# Patient Record
Sex: MALE | Race: WHITE | NOT HISPANIC OR LATINO | Employment: FULL TIME | URBAN - METROPOLITAN AREA
[De-identification: names, ages, dates, MRNs, and addresses within clinical notes are randomized per-mention and may not be internally consistent; named-entity substitution may affect disease eponyms.]

---

## 2019-02-13 ENCOUNTER — APPOINTMENT (OUTPATIENT)
Dept: RADIOLOGY | Facility: MEDICAL CENTER | Age: 34
End: 2019-02-13
Attending: EMERGENCY MEDICINE
Payer: COMMERCIAL

## 2019-02-13 ENCOUNTER — HOSPITAL ENCOUNTER (EMERGENCY)
Facility: MEDICAL CENTER | Age: 34
End: 2019-02-13
Attending: EMERGENCY MEDICINE
Payer: COMMERCIAL

## 2019-02-13 VITALS
WEIGHT: 186.51 LBS | TEMPERATURE: 97.2 F | OXYGEN SATURATION: 98 % | RESPIRATION RATE: 18 BRPM | DIASTOLIC BLOOD PRESSURE: 72 MMHG | HEART RATE: 61 BPM | BODY MASS INDEX: 25.3 KG/M2 | SYSTOLIC BLOOD PRESSURE: 152 MMHG

## 2019-02-13 DIAGNOSIS — R51.9 ACUTE NONINTRACTABLE HEADACHE, UNSPECIFIED HEADACHE TYPE: ICD-10-CM

## 2019-02-13 LAB
ALBUMIN SERPL BCP-MCNC: 5 G/DL (ref 3.2–4.9)
ALBUMIN/GLOB SERPL: 1.7 G/DL
ALP SERPL-CCNC: 73 U/L (ref 30–99)
ALT SERPL-CCNC: 33 U/L (ref 2–50)
ANION GAP SERPL CALC-SCNC: 10 MMOL/L (ref 0–11.9)
APTT PPP: 35.7 SEC (ref 24.7–36)
AST SERPL-CCNC: 33 U/L (ref 12–45)
BASOPHILS # BLD AUTO: 0.5 % (ref 0–1.8)
BASOPHILS # BLD: 0.05 K/UL (ref 0–0.12)
BILIRUB SERPL-MCNC: 0.8 MG/DL (ref 0.1–1.5)
BUN SERPL-MCNC: 7 MG/DL (ref 8–22)
CALCIUM SERPL-MCNC: 10.5 MG/DL (ref 8.5–10.5)
CHLORIDE SERPL-SCNC: 107 MMOL/L (ref 96–112)
CO2 SERPL-SCNC: 23 MMOL/L (ref 20–33)
CREAT SERPL-MCNC: 0.92 MG/DL (ref 0.5–1.4)
EOSINOPHIL # BLD AUTO: 0.13 K/UL (ref 0–0.51)
EOSINOPHIL NFR BLD: 1.2 % (ref 0–6.9)
ERYTHROCYTE [DISTWIDTH] IN BLOOD BY AUTOMATED COUNT: 40.8 FL (ref 35.9–50)
GLOBULIN SER CALC-MCNC: 2.9 G/DL (ref 1.9–3.5)
GLUCOSE SERPL-MCNC: 90 MG/DL (ref 65–99)
HCT VFR BLD AUTO: 52.7 % (ref 42–52)
HGB BLD-MCNC: 17.7 G/DL (ref 14–18)
IMM GRANULOCYTES # BLD AUTO: 0.04 K/UL (ref 0–0.11)
IMM GRANULOCYTES NFR BLD AUTO: 0.4 % (ref 0–0.9)
INR PPP: 0.96 (ref 0.87–1.13)
LYMPHOCYTES # BLD AUTO: 2.15 K/UL (ref 1–4.8)
LYMPHOCYTES NFR BLD: 19.8 % (ref 22–41)
MCH RBC QN AUTO: 30.8 PG (ref 27–33)
MCHC RBC AUTO-ENTMCNC: 33.6 G/DL (ref 33.7–35.3)
MCV RBC AUTO: 91.7 FL (ref 81.4–97.8)
MONOCYTES # BLD AUTO: 0.46 K/UL (ref 0–0.85)
MONOCYTES NFR BLD AUTO: 4.2 % (ref 0–13.4)
NEUTROPHILS # BLD AUTO: 8.04 K/UL (ref 1.82–7.42)
NEUTROPHILS NFR BLD: 73.9 % (ref 44–72)
NRBC # BLD AUTO: 0 K/UL
NRBC BLD-RTO: 0 /100 WBC
PLATELET # BLD AUTO: 207 K/UL (ref 164–446)
PMV BLD AUTO: 10.7 FL (ref 9–12.9)
POTASSIUM SERPL-SCNC: 4.1 MMOL/L (ref 3.6–5.5)
PROT SERPL-MCNC: 7.9 G/DL (ref 6–8.2)
PROTHROMBIN TIME: 12.9 SEC (ref 12–14.6)
RBC # BLD AUTO: 5.75 M/UL (ref 4.7–6.1)
SODIUM SERPL-SCNC: 140 MMOL/L (ref 135–145)
WBC # BLD AUTO: 10.9 K/UL (ref 4.8–10.8)

## 2019-02-13 PROCEDURE — 96376 TX/PRO/DX INJ SAME DRUG ADON: CPT

## 2019-02-13 PROCEDURE — 700111 HCHG RX REV CODE 636 W/ 250 OVERRIDE (IP): Performed by: EMERGENCY MEDICINE

## 2019-02-13 PROCEDURE — 700101 HCHG RX REV CODE 250: Performed by: EMERGENCY MEDICINE

## 2019-02-13 PROCEDURE — 70496 CT ANGIOGRAPHY HEAD: CPT

## 2019-02-13 PROCEDURE — 96375 TX/PRO/DX INJ NEW DRUG ADDON: CPT

## 2019-02-13 PROCEDURE — 700117 HCHG RX CONTRAST REV CODE 255: Performed by: EMERGENCY MEDICINE

## 2019-02-13 PROCEDURE — 85025 COMPLETE CBC W/AUTO DIFF WBC: CPT

## 2019-02-13 PROCEDURE — 96372 THER/PROPH/DIAG INJ SC/IM: CPT

## 2019-02-13 PROCEDURE — 99284 EMERGENCY DEPT VISIT MOD MDM: CPT

## 2019-02-13 PROCEDURE — 20552 NJX 1/MLT TRIGGER POINT 1/2: CPT

## 2019-02-13 PROCEDURE — 85730 THROMBOPLASTIN TIME PARTIAL: CPT

## 2019-02-13 PROCEDURE — 80053 COMPREHEN METABOLIC PANEL: CPT

## 2019-02-13 PROCEDURE — 96374 THER/PROPH/DIAG INJ IV PUSH: CPT

## 2019-02-13 PROCEDURE — 700105 HCHG RX REV CODE 258: Performed by: EMERGENCY MEDICINE

## 2019-02-13 PROCEDURE — 85610 PROTHROMBIN TIME: CPT

## 2019-02-13 RX ORDER — KETOROLAC TROMETHAMINE 30 MG/ML
30 INJECTION, SOLUTION INTRAMUSCULAR; INTRAVENOUS ONCE
Status: COMPLETED | OUTPATIENT
Start: 2019-02-13 | End: 2019-02-13

## 2019-02-13 RX ORDER — ONDANSETRON 2 MG/ML
4 INJECTION INTRAMUSCULAR; INTRAVENOUS ONCE
Status: COMPLETED | OUTPATIENT
Start: 2019-02-13 | End: 2019-02-13

## 2019-02-13 RX ORDER — SODIUM CHLORIDE 9 MG/ML
1000 INJECTION, SOLUTION INTRAVENOUS ONCE
Status: COMPLETED | OUTPATIENT
Start: 2019-02-13 | End: 2019-02-13

## 2019-02-13 RX ORDER — LIDOCAINE HYDROCHLORIDE 20 MG/ML
20 INJECTION, SOLUTION INFILTRATION; PERINEURAL ONCE
Status: COMPLETED | OUTPATIENT
Start: 2019-02-13 | End: 2019-02-13

## 2019-02-13 RX ORDER — MORPHINE SULFATE 4 MG/ML
4 INJECTION, SOLUTION INTRAMUSCULAR; INTRAVENOUS
Status: DISCONTINUED | OUTPATIENT
Start: 2019-02-13 | End: 2019-02-13 | Stop reason: HOSPADM

## 2019-02-13 RX ORDER — BUPIVACAINE HYDROCHLORIDE 5 MG/ML
10 INJECTION, SOLUTION EPIDURAL; INTRACAUDAL ONCE
Status: COMPLETED | OUTPATIENT
Start: 2019-02-13 | End: 2019-02-13

## 2019-02-13 RX ADMIN — ONDANSETRON 4 MG: 2 INJECTION INTRAMUSCULAR; INTRAVENOUS at 17:13

## 2019-02-13 RX ADMIN — SODIUM CHLORIDE 1000 ML: 9 INJECTION, SOLUTION INTRAVENOUS at 17:14

## 2019-02-13 RX ADMIN — LIDOCAINE HYDROCHLORIDE 20 ML: 20 INJECTION, SOLUTION INFILTRATION; PERINEURAL at 20:48

## 2019-02-13 RX ADMIN — KETOROLAC TROMETHAMINE 30 MG: 30 INJECTION, SOLUTION INTRAMUSCULAR at 21:30

## 2019-02-13 RX ADMIN — MORPHINE SULFATE 4 MG: 4 INJECTION INTRAVENOUS at 17:13

## 2019-02-13 RX ADMIN — BUPIVACAINE HYDROCHLORIDE 10 ML: 5 INJECTION, SOLUTION EPIDURAL; INTRACAUDAL; PERINEURAL at 20:48

## 2019-02-13 RX ADMIN — MORPHINE SULFATE 4 MG: 4 INJECTION INTRAVENOUS at 18:39

## 2019-02-13 RX ADMIN — IOHEXOL 100 ML: 350 INJECTION, SOLUTION INTRAVENOUS at 18:18

## 2019-02-13 ASSESSMENT — ENCOUNTER SYMPTOMS
BLURRED VISION: 0
HEADACHES: 1
FEVER: 0

## 2019-02-14 NOTE — ED TRIAGE NOTES
"35 y/o male ambulatory to triage with c/o sudden onset of severe headache. Pt states the pain starts at the base of his neck and radiates around to the front of his head. He states that when the symptoms initially occurred he had a weak feeling in his left arm but that has resolved. C/o mild light and sound sensitivity. Denies history of similar events and states \"I don't get headaches often\". No unilateral deficits noted.  "

## 2019-02-14 NOTE — ED PROVIDER NOTES
ED Provider Note    Scribed for John Contreras M.D. by Taurus Felix. 2/13/2019, 4:53 PM.    Primary care provider: Pcp Unknown  Means of arrival: walk in  History obtained from: patient  History limited by: none    CHIEF COMPLAINT  Chief Complaint   Patient presents with   • Headache       HPI  Micheal Kelsey is a 34 y.o. male who presents to the Emergency Department complaining of sudden headache starting 5 hours ago. He describes his headache as severe, constant and pulsating that is localized in his posterior head. Patient reports associated photophobia. He states that his pain came on suddenly when driving to work that has been constant since onset, prompting him to come to the ED for evaluation. Patient reports a similar episode of headache after having a lumbar procedure performed in 2010. He reports a history of migraines. Patient denies blurred vision, fever.     REVIEW OF SYSTEMS  Review of Systems   Constitutional: Negative for fever.   Eyes: Negative for blurred vision.        Photophobia.    Neurological: Positive for headaches.   All other systems reviewed and are negative.    PAST MEDICAL HISTORY   has a past medical history of Backpain and Migraines.    SURGICAL HISTORY   has a past surgical history that includes tonsillectomy; lumbar fusion anterior (5/26/2011); and other orthopedic surgery.    SOCIAL HISTORY  Social History   Substance Use Topics   • Smoking status: No   • Smokeless tobacco: No   • Alcohol use Yes      Comment:  2 to 3 drinks per month      FAMILY HISTORY  None    CURRENT MEDICATIONS  Current Outpatient Prescriptions:   •  TRAZODONE HCL PO, Take  by mouth every bedtime., Disp: , Rfl:   •  Hydrocodone-Acetaminophen (NORCO PO), Take  by mouth., Disp: , Rfl:   •  GABAPENTIN, by Does not apply route., Disp: , Rfl:     ALLERGIES  No Known Allergies    PHYSICAL EXAM  VITAL SIGNS: /82   Pulse 73   Temp 36.2 °C (97.2 °F) (Temporal)   Resp 14   Wt 84.6 kg (186 lb  8.2 oz)   SpO2 98%   BMI 25.30 kg/m²     Constitutional: Well developed, Well nourished, No acute distress, Non-toxic appearance.   HENT: Normocephalic, Atraumatic, Bilateral external ears normal, oropharynx moist, No oral exudates, Nose normal.   Eyes:conjunctiva is normal, there are no signs of exudate.   Neck: Supple, no meningeal signs.  Lymphatic: No lymphadenopathy noted.   Cardiovascular: Regular rate and rhythm without murmurs gallops or rubs.   Thorax & Lungs: No respiratory distress. Breathing comfortably. Lungs are clear to auscultation bilaterally, there are no wheezes no rales. Chest wall is nontender.  Abdomen: Soft, nontender, nondistended. Bowel sounds are present.   Skin: Warm, Dry, No erythema,   Back: No tenderness, No CVA tenderness.  Musculoskeletal: Good range of motion in all major joints. No tenderness to palpation or major deformities noted. Intact distal pulses, no clubbing, no cyanosis, no edema,   Neurologic: Alert & oriented x 3, Moving all extremities. No gross abnormalities. Good flexion and extension of the neck No Tinel's sign over the occipital nerves. Cranial nerves II-XII grossly intact, moving all 4 extremities, 5/5 strength in all 4 extremities, cerebellar functions intact, no other focal abnormalities.  Psychiatric: Affect normal, Judgment normal, Mood normal.     LABS  Labs Reviewed   CBC WITH DIFFERENTIAL - Abnormal; Notable for the following:        Result Value    WBC 10.9 (*)     Hematocrit 52.7 (*)     MCHC 33.6 (*)     Neutrophils-Polys 73.90 (*)     Lymphocytes 19.80 (*)     Neutrophils (Absolute) 8.04 (*)     All other components within normal limits    Narrative:     Indicate which anticoagulants the patient is on:->NONE   COMP METABOLIC PANEL - Abnormal; Notable for the following:     Bun 7 (*)     Albumin 5.0 (*)     All other components within normal limits    Narrative:     Indicate which anticoagulants the patient is on:->NONE   PROTHROMBIN TIME    Narrative:      Indicate which anticoagulants the patient is on:->NONE   APTT    Narrative:     Indicate which anticoagulants the patient is on:->NONE   ESTIMATED GFR    Narrative:     Indicate which anticoagulants the patient is on:->NONE   All labs reviewed by me.    RADIOLOGY  CT-CTA HEAD WITH & W/O-POST PROCESS   Final Result      CT angiogram of the [place] within normal limits.      The radiologist's interpretation of all radiological studies have been reviewed by me.    Trigger Point Injection Procedure Note    Indication: pain control    Procedure: The patient was placed in the appropriate position and the area over the trigger point was prepped with betadine. Injection was performed into the trigger point area using mixed 50:50 2% lidocaine and 0.5% Bupivacaine without epinephrine. The injection site was covered with a bandage.  Also the greater and lesser occipital nerves were injected on bilateral sides.  The points of maximal tenderness were found in the areas of the nerves.  Using a total of 1 cc of the combined medication in 4 spots had adequate resolution of discomfort in the area.    The patient tolerated the procedure well.    Complications: None    COURSE & MEDICAL DECISION MAKING  Pertinent Labs & Imaging studies reviewed. (See chart for details)    4:53 PM - Patient seen and examined at bedside. Discussed his evaluation in the ED. Based on his history and exam, I feel that CAT scan is warranted to rule out IC abnormalities. I explained in detail the innervation of the brain and possible emergent processes that could be the cause of his symptoms.. Patient verbalizes understanding and agreement to this plan of care. Patient will be treated with NS 1000 ml for headache, Zofran 4 mg, Morphine 4 mg. Ordered CT CTA head, CBC with differential, CMP, PT/INR, APTT to evaluate his symptoms. The differential diagnoses include but are not limited to: Imigraine headache, occipital neuralgia, subarachnoid  hemorrhage     8:11 PM - Patient reevaluated at bedside. Patient had a positive response to IV fluids with improved headache. He states that his headache remains and was only slightly improved with interventions. Re exam reveals tenderness about the paraspinous musculature and the insertion of the skull. At this time there is no acute medical emergency on radiology that would warrant additional evaluation. I discussed treatment with the patient with trigger point injection. I explained this procedure to the patient in detail. He verbalizes agreement to this procedure.     8:38 PM - Performed trigger point injection procedure at bedside. See above for details. I provided him with wound care instructions. Patient is instructed to return with any new or worsening symptoms, specifically if he develops fever, spreading erythema, or discharge from the wound or any additional neurologic symptoms. He is instructed to follow up with his primary and a physical therapist. Patient verbalizes understanding and agrees to discharge. Discharge vitals: /72   Pulse 61   Temp 36.2 °C (97.2 °F) (Temporal)   Resp 18   Wt 84.6 kg (186 lb 8.2 oz)   SpO2 98%   BMI 25.30 kg/m²     Decision Making:  Patient presents for evaluation.  Clinically the concern is for subarachnoid hemorrhage intracerebral bleed.  CT scan with CT Christina Tyson was done there is no signs of abnormalities always within the less than 5 hours.  This point patient was given some pain medication states the headache is improved but now is complaining more of neck pain.  On reexamination there seems to be more trigger spot tenderness on both sides around the occipital nerve regions.  I did do nerve blocks as well as trigger point injections as described above.  Patient states that he had a good relief of discomfort in that area.  He was also given Toradol for pain control.  At this point I do feel is more muscular neck pain causing his headache.  I recommended  for him to follow-up with his primary care physician for recheck and physical therapy referral as necessary.    The patient will return for new or worsening symptoms and is stable at the time of discharge.    The patient is referred to a primary physician for blood pressure management, diabetic screening, and for all other preventative health concerns.    DISPOSITION:  Patient will be discharged home in stable condition.    FOLLOW UP:  Vladimir Zhou M.D.  [address] [address]  [phone]    Schedule an appointment as soon as possible for a visit   For further evaluation, Return if any symptoms worsen      FINAL IMPRESSION  Trigger point injection procedure performed by ERP.   1. Acute nonintractable headache, unspecified headache type          I, Taurus Felix (Scribe), am scribing for, and in the presence of, John Contreras M.D..    Electronically signed by: Taurus Felix (Scribe), 2/13/2019    I, John Contreras M.D. personally performed the services described in this documentation, as scribed by Taurus Felix in my presence, and it is both accurate and complete. C    The note accurately reflects work and decisions made by me.  John Contreras  2/13/2019  10:00 PM